# Patient Record
Sex: MALE | Race: BLACK OR AFRICAN AMERICAN
[De-identification: names, ages, dates, MRNs, and addresses within clinical notes are randomized per-mention and may not be internally consistent; named-entity substitution may affect disease eponyms.]

---

## 2017-06-15 ENCOUNTER — HOSPITAL ENCOUNTER (EMERGENCY)
Dept: HOSPITAL 7 - FB.ED | Age: 22
Discharge: HOME | End: 2017-06-15
Payer: COMMERCIAL

## 2017-06-15 VITALS — DIASTOLIC BLOOD PRESSURE: 65 MMHG | SYSTOLIC BLOOD PRESSURE: 110 MMHG

## 2017-06-15 DIAGNOSIS — F17.210: ICD-10-CM

## 2017-06-15 DIAGNOSIS — R10.84: Primary | ICD-10-CM

## 2017-06-15 PROCEDURE — 80053 COMPREHEN METABOLIC PANEL: CPT

## 2017-06-15 PROCEDURE — 82150 ASSAY OF AMYLASE: CPT

## 2017-06-15 PROCEDURE — 36415 COLL VENOUS BLD VENIPUNCTURE: CPT

## 2017-06-15 PROCEDURE — 99284 EMERGENCY DEPT VISIT MOD MDM: CPT

## 2017-06-15 PROCEDURE — 74177 CT ABD & PELVIS W/CONTRAST: CPT

## 2017-06-15 PROCEDURE — 85027 COMPLETE CBC AUTOMATED: CPT

## 2017-06-15 PROCEDURE — 96365 THER/PROPH/DIAG IV INF INIT: CPT

## 2017-06-15 PROCEDURE — 96375 TX/PRO/DX INJ NEW DRUG ADDON: CPT

## 2017-06-15 PROCEDURE — 81003 URINALYSIS AUTO W/O SCOPE: CPT

## 2017-06-15 NOTE — EDM.PDOC
ED HPI GENERAL MEDICAL PROBLEM





- General


Chief Complaint: Abdominal Pain


Stated Complaint: STOMACH PAIN, VOMITTING


Time Seen by Provider: 06/15/17 15:45


Source of Information: Reports: Patient


History Limitations: Reports: No Limitations





- History of Present Illness


INITIAL COMMENTS - FREE TEXT/NARRATIVE: 





20 yo gentleman who presents to the ER with 4 day h/o progressively worsening 

abdominal pain. Reports that symptoms started insidiously 4 days ago. Described 

pain as sharp generalized abdominal pain -- Feels pain mainly on the Left side 

of the abdomen. Reports associated N/V. Cannot seem to keep anything down. 

Denied any fever, chills, Chest pain, SOB or urinary symptoms. Presented to the 

ER on account of worsening symptoms.


Onset: Gradual


Onset Date: 06/11/17


Duration: Day(s): (started 4 days ago), Getting Worse


Location: Reports: Abdomen


Quality: Reports: Sharp, Stabbing


Severity: Severe


Worsens with: Reports: None


Associated Symptoms: Reports: Nausea/Vomiting


  ** Middle Abdomen


Pain Score (Numeric/FACES): 8





- Related Data


 Allergies











Allergy/AdvReac Type Severity Reaction Status Date / Time


 


No Known Allergies Allergy   Verified 06/15/17 15:32











Home Meds: 


 Home Meds





Ondansetron [Zofran] 4 mg PO Q6H PRN #20 tab 06/15/17 [Rx]


traMADol [Ultram] 50 mg PO Q6H PRN #20 tab 06/15/17 [Rx]











Past Medical History





- Past Health History


Medical/Surgical History: Denies Medical/Surgical History





Social & Family History





- Tobacco Use


Smoking Status *Q: Current Every Day Smoker


Years of Tobacco use: 4


Packs/Tins Daily: 0.2


Second Hand Smoke Exposure: No





- Caffeine Use


Caffeine Use: Reports: None





- Recreational Drug Use


Recreational Drug Use: No


Recreational Drug Type: Reports: Marijuana/Hashish


Recreational Drug Use Frequency: Daily





ED ROS GENERAL





- Review of Systems


Review Of Systems: ROS reveals no pertinent complaints other than HPI.





ED EXAM, GI/ABD





- Physical Exam


Exam: See Below


Exam Limited By: No Limitations


General Appearance: Alert, WD/WN, Mild Distress


Eyes: Bilateral: Normal Appearance, EOMI


Ears: Normal External Exam, Normal Canal, Hearing Grossly Normal, Normal TMs


Nose: Normal Inspection, Normal Mucosa


Throat/Mouth: Normal Inspection, Normal Lips, Normal Teeth, Normal Gums, Normal 

Oropharynx


Head: Atraumatic, Normocephalic


Neck: Normal Inspection, Supple, Non-Tender, Full Range of Motion


Respiratory/Chest: No Respiratory Distress, Lungs Clear, Normal Breath Sounds, 

No Accessory Muscle Use


Cardiovascular: Normal Peripheral Pulses, Regular Rate, Rhythm, No Edema, No 

Gallop, No JVD, No Murmur


GI/Abdominal: Normal Bowel Sounds, Soft, No Organomegaly, No Distention, 

Tenderness, Other (Vague generalized but maily upper abdominal tenderness)


Back Exam: Normal Inspection, Full Range of Motion


Extremities: Normal Inspection, Normal Range of Motion, Non-Tender, No Pedal 

Edema


Neurological: Alert, Oriented, CN II-XII Intact, Normal Cognition, Normal Gait, 

Normal Reflexes


Psychiatric: Normal Affect, Normal Mood


Skin Exam: Warm, Dry, Intact, Normal Color, No Rash


Lymphatic: No Adenopathy





Course





- Vital Signs


Last Recorded V/S: 


 Last Vital Signs











Temp  36.7 C   06/15/17 15:39


 


Pulse  57 L  06/15/17 17:16


 


Resp  16   06/15/17 17:16


 


BP  128/83   06/15/17 17:16


 


Pulse Ox  100   06/15/17 17:16














- Orders/Labs/Meds


Orders: 


 Active Orders 24 hr











 Category Date Time Status


 


 Abdomen Pelvis w Cont [CT] Stat Exams  06/15/17 15:51 Taken











Labs: 


 Laboratory Tests











  06/15/17 06/15/17 06/15/17 Range/Units





  15:55 15:55 16:30 


 


WBC  4.6    (4.5-12.0)  X10-3/uL


 


RBC  4.78    (4.30-5.75)  x10(6)uL


 


Hgb  15.3    (11.5-15.5)  g/dL


 


Hct  44.5    (30.0-51.3)  %


 


MCV  93.0    (80-96)  fL


 


MCH  32.0    (27.7-33.6)  pg


 


MCHC  34.4    (32.2-35.4)  g/dL


 


RDW  12.5    (11.5-15.5)  %


 


Plt Count  297    (125-369)  X10(3)uL


 


Sodium   141   (135-145)  mmol/L


 


Potassium   4.1   (3.5-5.3)  mmol/L


 


Chloride   105   (100-110)  mmol/L


 


Carbon Dioxide   30 H   (23-29)  mmol/L


 


BUN   12   (5-20)  mg/dL


 


Creatinine   0.9   (0.6-1.3)  mg/dL


 


Est Cr Clr Drug Dosing   135.13   mL/min


 


Estimated GFR (MDRD)   > 60   (>60)  


 


BUN/Creatinine Ratio   13.3   (9-20)  


 


Glucose   106   ()  mg/dL


 


Calcium   9.6   (8.6-10.2)  mg/dL


 


Total Bilirubin   0.8   (0.1-1.3)  mg/dL


 


AST   28 H   (5-27)  IU/L


 


ALT   21   (14-26)  IU/L


 


Alkaline Phosphatase   77   ()  IU/L


 


Total Protein   7.9   (6.0-8.0)  g/dL


 


Albumin   4.5   (3.5-5.2)  g/dL


 


Globulin   3.4   g/dL


 


Albumin/Globulin Ratio   1.3   


 


Amylase   78   ()  U/L


 


Urine Color    Yellow  (YELLOW)  


 


Urine Appearance    Clear  (CLEAR)  


 


Urine pH    8.0 H  (5.0-6.5)  


 


Ur Specific Gravity    1.015  (1.010-1.025)  


 


Urine Protein    Negative  (NEGATIVE)  mg/dL


 


Urine Glucose (UA)    Normal  (NEGATIVE)  mg/dL


 


Urine Ketones    Negative  (NEGATIVE)  mg/dL


 


Urine Occult Blood    Negative  (NEGATIVE)  


 


Urine Nitrite    Negative  (NEGATIVE)  


 


Urine Bilirubin    Negative  (NEGATIVE)  


 


Urine Urobilinogen    Normal  (NEGATIVE)  mg/dL


 


Ur Leukocyte Esterase    Negative  (NEGATIVE)  











Meds: 


Medications














Discontinued Medications














Generic Name Dose Route Start Last Admin





  Trade Name Freq  PRN Reason Stop Dose Admin


 


Sodium Chloride  1,000 mls @ 1,000 mls/hr  06/15/17 15:52  06/15/17 16:06





  Normal Saline  IV  06/15/17 16:51  1,000 mls/hr





  .BOLUS ONE   Administration


 


Iopamidol  75 ml  06/15/17 16:47  06/15/17 17:12





  Isovue-370 (76%)  IV  06/15/17 16:48  75 ml





  ONETIME ONE   Administration


 


Morphine Sulfate  2 mg  06/15/17 15:53  06/15/17 16:05





  Morphine  IVPUSH  06/15/17 15:54  2 mg





  ONETIME ONE   Administration


 


Ondansetron HCl  4 mg  06/15/17 15:53  06/15/17 16:06





  Zofran  IVPUSH  06/15/17 15:54  4 mg





  ONETIME ONE   Administration














Departure





- Departure


Time of Disposition: 18:25


Disposition: Home, Self-Care 01


Clinical Impression: 


Abdominal pain


Qualifiers:


 Abdominal location: generalized Qualified Code(s): R10.84 - Generalized 

abdominal pain








- Discharge Information


Prescriptions: 


Ondansetron [Zofran] 4 mg PO Q6H PRN #20 tab


 PRN Reason: nausea, vomiting


traMADol [Ultram] 50 mg PO Q6H PRN #20 tab


 PRN Reason: Pain


Instructions:  Abdominal Pain, Adult, Easy-to-Read


Referrals: 


PCP,None [Primary Care Provider] - 


Forms:  ED Department Discharge


Additional Instructions: 


Follow with PCP


Tramadol as needed for pain


Return if symptoms worsen


Call your Physician or Return to Emergency Department if:





   * Your condition worsens in any way.


   * You develop fever greater than 100.4.


   * You have vomitting that does not stop with medications.


   * You have pain that is not controlled with medications.





- Problem List Review


Problem List Initiated/Reviewed/Updated: Yes





- My Orders


Last 24 Hours: 


My Active Orders





06/15/17 15:51


Abdomen Pelvis w Cont [CT] Stat 














- Assessment/Plan


Last 24 Hours: 


My Active Orders





06/15/17 15:51


Abdomen Pelvis w Cont [CT] Stat

## 2017-06-24 ENCOUNTER — HOSPITAL ENCOUNTER (EMERGENCY)
Dept: HOSPITAL 7 - FB.ED | Age: 22
Discharge: HOME | End: 2017-06-24
Payer: COMMERCIAL

## 2017-06-24 VITALS — DIASTOLIC BLOOD PRESSURE: 82 MMHG | SYSTOLIC BLOOD PRESSURE: 112 MMHG

## 2017-06-24 DIAGNOSIS — F17.210: ICD-10-CM

## 2017-06-24 DIAGNOSIS — R10.9: Primary | ICD-10-CM

## 2017-06-24 DIAGNOSIS — G89.29: ICD-10-CM

## 2017-06-24 PROCEDURE — 80053 COMPREHEN METABOLIC PANEL: CPT

## 2017-06-24 PROCEDURE — 81001 URINALYSIS AUTO W/SCOPE: CPT

## 2017-06-24 PROCEDURE — 99284 EMERGENCY DEPT VISIT MOD MDM: CPT

## 2017-06-24 PROCEDURE — 36415 COLL VENOUS BLD VENIPUNCTURE: CPT

## 2017-06-24 PROCEDURE — 85025 COMPLETE CBC W/AUTO DIFF WBC: CPT

## 2017-06-24 PROCEDURE — 74177 CT ABD & PELVIS W/CONTRAST: CPT

## 2017-06-24 NOTE — EDM.PDOC
ED HPI GENERAL MEDICAL PROBLEM





- General


Chief Complaint: Abdominal Pain


Stated Complaint: STOMACH PAIN


Time Seen by Provider: 06/24/17 10:00


Source of Information: Reports: Patient


History Limitations: Reports: No Limitations





- History of Present Illness


Onset: Today


Onset Date: 04/24/17


Duration: Chronic, Waxing/Waning


Location: Reports: Abdomen


Quality: Reports: Ache, Dull, Same as Previous Episode


Severity: Moderate


Improves with: Reports: None


Worsens with: Reports: None


Context: Reports: Other (Chronic in nature.)


Associated Symptoms: Reports: No Other Symptoms (Ch)


Treatments PTA: Reports: Other (see below) (Tramadol)


  ** Abdominal


Pain Score (Numeric/FACES): 8





- Related Data


 Allergies











Allergy/AdvReac Type Severity Reaction Status Date / Time


 


No Known Allergies Allergy   Verified 06/24/17 09:49











Home Meds: 


 Home Meds





Ondansetron [Zofran] 4 mg PO Q6H PRN #20 tab 06/15/17 [Rx]


traMADol [Ultram] 50 mg PO Q6H PRN #20 tab 06/15/17 [Rx]











Past Medical History





- Past Health History


Medical/Surgical History: Denies Medical/Surgical History





Social & Family History





- Tobacco Use


Smoking Status *Q: Current Every Day Smoker


Years of Tobacco use: 4


Packs/Tins Daily: 1


Second Hand Smoke Exposure: No





- Caffeine Use


Caffeine Use: Reports: Energy Drinks, Soda





- Recreational Drug Use


Recreational Drug Use: No


Recreational Drug Type: Reports: Marijuana/Hashish


Recreational Drug Use Frequency: Daily





ED ROS GENERAL





- Review of Systems


Review Of Systems: See Below


Constitutional: Reports: No Symptoms


HEENT: Reports: No Symptoms


Respiratory: Reports: No Symptoms


Cardiovascular: Reports: No Symptoms


Endocrine: Reports: No Symptoms


GI/Abdominal: Reports: Abdominal Pain


: Reports: No Symptoms


Musculoskeletal: Reports: No Symptoms


Skin: Reports: No Symptoms


Neurological: Reports: No Symptoms


Psychiatric: Reports: No Symptoms


Hematologic/Lymphatic: Reports: No Symptoms


Immunologic: Reports: No Symptoms





ED EXAM, GI/ABD





- Physical Exam


Exam: See Below


Exam Limited By: No Limitations


General Appearance: Alert, WD/WN, No Apparent Distress


Eyes: Bilateral: Normal Appearance, EOMI


Ears: Normal External Exam, Normal Canal, Hearing Grossly Normal, Normal TMs


Nose: Normal Inspection, Normal Mucosa, No Blood


Throat/Mouth: Normal Inspection, Normal Lips, Normal Teeth, Normal Gums, Normal 

Oropharynx, Normal Voice, No Airway Compromise


Head: Atraumatic, Normocephalic


Neck: Normal Inspection, Supple, Non-Tender, Full Range of Motion


Respiratory/Chest: No Respiratory Distress, Lungs Clear, Normal Breath Sounds, 

No Accessory Muscle Use, Chest Non-Tender


Cardiovascular: Normal Peripheral Pulses


GI/Abdominal: Normal Bowel Sounds, Soft, No Organomegaly, No Distention, No 

Abnormal Bruit, No Mass, Tenderness (minimally in left upper quadrant area.)


 (Male) Exam: No Hernia, Normal Inspection, Normal Prostate, Circumcised


Back Exam: Normal Inspection, Full Range of Motion, NT


Extremities: Normal Inspection, Normal Range of Motion, Non-Tender, Normal 

Capillary Refill, No Pedal Edema


Neurological: Alert, Oriented, CN II-XII Intact, Normal Cognition, Normal Gait, 

Normal Reflexes, No Motor/Sensory Deficits


Psychiatric: Normal Affect, Normal Mood


Skin Exam: Warm, Dry, Intact, Normal Color, No Rash





Course





- Vital Signs


Text/Narrative:: 





Uneventful ED course. His pain came down from an 8/10 level to a 2/10 level and 

after the normal labs and a CT of the abdomen that shows constipation he wanted 

to go home. He will follow up in [place] with a General Internist or Hematologist 

since he thinks he may have Sickle Cell Trait that could be causing some 

abdominal discomfort.


Last Recorded V/S: 


 Last Vital Signs











Temp  36.8 C   06/24/17 12:18


 


Pulse  59 L  06/24/17 12:18


 


Resp  16   06/24/17 12:18


 


BP  112/82   06/24/17 12:18


 


Pulse Ox  100   06/24/17 12:18














- Orders/Labs/Meds


Orders: 


 Active Orders 24 hr











 Category Date Time Status


 


 Abdomen Pelvis w Cont [CT] Stat Exams  06/24/17 10:16 Taken


 


 Sodium Chloride 0.9% [Saline Flush] Med  06/24/17 10:21 Active





 10 ml FLUSH ASDIRECTED PRN   


 


 Saline Lock Insert [OM.PC] Routine Oth  06/24/17 10:21 Ordered








 Medication Orders





Sodium Chloride (Saline Flush)  10 ml FLUSH ASDIRECTED PRN


   PRN Reason: Keep Vein Open


   Last Admin: 06/24/17 10:55  Dose: 10 ml








Labs: 


 Laboratory Tests











  06/24/17 06/24/17 06/24/17 Range/Units





  10:35 10:35 11:00 


 


WBC  5.6    (4.5-12.0)  X10-3/uL


 


RBC  4.61    (4.30-5.75)  x10(6)uL


 


Hgb  14.9    (11.5-15.5)  g/dL


 


Hct  43.3    (30.0-51.3)  %


 


MCV  94.0    (80-96)  fL


 


MCH  32.4    (27.7-33.6)  pg


 


MCHC  34.5    (32.2-35.4)  g/dL


 


RDW  12.5    (11.5-15.5)  %


 


Plt Count  310    (125-369)  X10(3)uL


 


MPV  7.4    (7.4-10.4)  fL


 


Neut % (Auto)  46.0    (46-82)  %


 


Lymph % (Auto)  38.1 H    (13-37)  %


 


Mono % (Auto)  12.6 H    (4-12)  %


 


Eos % (Auto)  2    (1.0-5.0)  %


 


Baso % (Auto)  1    (0-2)  %


 


Neut # (Auto)  2.6    (1.6-8.3)  #


 


Lymph # (Auto)  2.1    (0.6-5.0)  #


 


Mono # (Auto)  0.7    (0.0-1.3)  #


 


Eos # (Auto)  0.1    (0.0-0.8)  #


 


Baso # (Auto)  0.1    (0.0-0.2)  #


 


Sodium   139   (135-145)  mmol/L


 


Potassium   3.7   (3.5-5.3)  mmol/L


 


Chloride   104   (100-110)  mmol/L


 


Carbon Dioxide   29   (23-29)  mmol/L


 


BUN   13   (5-20)  mg/dL


 


Creatinine   0.8   (0.6-1.3)  mg/dL


 


Est Cr Clr Drug Dosing   145.57   mL/min


 


Estimated GFR (MDRD)   > 60   (>60)  


 


BUN/Creatinine Ratio   16.3   (9-20)  


 


Glucose   96   ()  mg/dL


 


Calcium   9.3   (8.6-10.2)  mg/dL


 


Total Bilirubin   0.5   (0.1-1.3)  mg/dL


 


AST   22  D   (5-27)  IU/L


 


ALT   16  D   (14-26)  IU/L


 


Alkaline Phosphatase   70   ()  IU/L


 


Total Protein   7.4   (6.0-8.0)  g/dL


 


Albumin   4.2   (3.5-5.2)  g/dL


 


Globulin   3.2   g/dL


 


Albumin/Globulin Ratio   1.3   


 


Urine Color    Yellow  (YELLOW)  


 


Urine Appearance    Turbid  (CLEAR)  


 


Urine pH    6.0  (5.0-6.5)  


 


Ur Specific Gravity    1.020  (1.010-1.025)  


 


Urine Protein    Negative  (NEGATIVE)  mg/dL


 


Urine Glucose (UA)    Normal  (NEGATIVE)  mg/dL


 


Urine Ketones    Negative  (NEGATIVE)  mg/dL


 


Urine Occult Blood    Negative  (NEGATIVE)  


 


Urine Nitrite    Negative  (NEGATIVE)  


 


Urine Bilirubin    Small H  (NEGATIVE)  


 


Urine Urobilinogen    4 H  (NEGATIVE)  mg/dL


 


Ur Leukocyte Esterase    Negative  (NEGATIVE)  


 


Urine RBC    5-10  (0)  


 


Urine WBC    0-5  (0)  


 


Ur Squamous Epith Cells    Rare  (NS,R,O)  


 


Urine Bacteria    Few H  (NS)  


 


Urine Mucus    Moderate H  (NS)  











Meds: 


Medications











Generic Name Dose Route Start Last Admin





  Trade Name Ramon  PRN Reason Stop Dose Admin


 


Sodium Chloride  10 ml  06/24/17 10:21  06/24/17 10:55





  Saline Flush  FLUSH   10 ml





  ASDIRECTED PRN   Administration





  Keep Vein Open   














Discontinued Medications














Generic Name Dose Route Start Last Admin





  Trade Name Ramon  PRN Reason Stop Dose Admin


 


Iopamidol  75 ml  06/24/17 10:43  06/24/17 11:00





  Isovue-370 (76%)  IV  06/24/17 10:44  75 ml





  ONETIME ONE   Administration














Departure





- Departure


Time of Disposition: 12:18


Disposition: DC/Tfer to Hospice - Home 50


Condition: Good


Clinical Impression: 


 Chronic abdominal pain








- Discharge Information


Instructions:  Abdominal Pain, Adult, Easy-to-Read


Referrals: 


PCP,None [Primary Care Provider] - 


Forms:  ED Department Discharge





- My Orders


Last 24 Hours: 


My Active Orders





06/24/17 10:16


Abdomen Pelvis w Cont [CT] Stat 





06/24/17 10:21


Sodium Chloride 0.9% [Saline Flush]   10 ml FLUSH ASDIRECTED PRN 


Saline Lock Insert [OM.PC] Routine 














- Assessment/Plan


Last 24 Hours: 


My Active Orders





06/24/17 10:16


Abdomen Pelvis w Cont [CT] Stat 





06/24/17 10:21


Sodium Chloride 0.9% [Saline Flush]   10 ml FLUSH ASDIRECTED PRN 


Saline Lock Insert [OM.PC] Routine

## 2019-11-07 ENCOUNTER — HOSPITAL ENCOUNTER (EMERGENCY)
Dept: HOSPITAL 7 - FB.ED | Age: 24
Discharge: HOME | End: 2019-11-07
Payer: MEDICAID

## 2019-11-07 VITALS — DIASTOLIC BLOOD PRESSURE: 66 MMHG | HEART RATE: 72 BPM | SYSTOLIC BLOOD PRESSURE: 123 MMHG
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
## 2019-11-07 DIAGNOSIS — S63.501A: Primary | ICD-10-CM

## 2019-11-07 PROCEDURE — 99283 EMERGENCY DEPT VISIT LOW MDM: CPT

## 2019-11-07 PROCEDURE — 73110 X-RAY EXAM OF WRIST: CPT

## 2019-11-07 PROCEDURE — 73130 X-RAY EXAM OF HAND: CPT

## 2019-11-07 NOTE — EDM.PDOC
ED HPI GENERAL MEDICAL PROBLEM





- General


Stated Complaint: R HAND INJURY


Time Seen by Provider: 11/07/19 08:15


Source of Information: Reports: Patient


History Limitations: Reports: No Limitations





- History of Present Illness


INITIAL COMMENTS - FREE TEXT/NARRATIVE: 





Patient prsented to the ED because of rt wrist and rt hand pain. He apparently 

was playing basketball yesterday lost his balance,fell and landed on his RUE.


  ** right hand


Pain Score (Numeric/FACES): 5





- Related Data


 Allergies











Allergy/AdvReac Type Severity Reaction Status Date / Time


 


No Known Allergies Allergy   Verified 11/07/19 08:41











Home Meds: 


 Home Meds





Ibuprofen [Motrin] 800 mg PO Q8H PRN #30 tablet 11/07/19 [Rx]











Past Medical History





- Past Health History


Medical/Surgical History: Denies Medical/Surgical History





Social & Family History





- Caffeine Use


Caffeine Use: Reports: Energy Drinks, Soda





Review of Systems





- Review of Systems


Review Of Systems: See Below


Constitutional: Reports: No Symptoms


Eyes: Reports: No Symptoms


Ears: Reports: No Symptoms


Nose: Reports: No Symptoms


Mouth/Throat: Reports: No Symptoms


Respiratory: Reports: No Symptoms


Cardiovascular: Reports: No Symptoms


GI/Abdominal: Reports: No Symptoms


Genitourinary: Reports: No Symptoms


Musculoskeletal: Reports: Other (tenderness on the rthand/wrist)


Skin: Reports: No Symptoms


Neurological: Reports: No Symptoms





ED EXAM, GENERAL





- Physical Exam


Exam: See Below


Exam Limited By: No Limitations


General Appearance: Alert, WD/WN, No Apparent Distress


Eye Exam: Bilateral Eye: PERRL


Ears: Normal External Exam


Nose: Normal Inspection, Normal Mucosa, No Blood


Throat/Mouth: Normal Inspection, Normal Lips, Normal Teeth


Head: Atraumatic, Normocephalic


Neck: Normal Inspection, Supple, Non-Tender, Full Range of Motion


Respiratory/Chest: No Respiratory Distress, Lungs Clear, Normal Breath Sounds, 

No Accessory Muscle Use


Cardiovascular: Normal Peripheral Pulses, Regular Rate, Rhythm, No Edema, No 

Gallop, No JVD, No Rub, JVD


Back Exam: Normal Inspection, Full Range of Motion


Extremities: Other (swelling and tenderness R hand/wrist)





Course





- Vital Signs


Text/Narrative:: 





xray RT wirst


Rt hand


ibuprofen 800 mg po x1


tylenol 1000 mg po x1


Last Recorded V/S: 


 Last Vital Signs











Temp  36.5 C   11/07/19 08:10


 


Pulse  72   11/07/19 08:10


 


Resp  22 H  11/07/19 08:10


 


BP  123/66   11/07/19 08:10


 


Pulse Ox  99   11/07/19 08:10














- Orders/Labs/Meds


Orders: 


 Active Orders 24 hr











 Category Date Time Status


 


 Hand Comp Min 3V Rt [CR] Stat Exams  11/07/19 08:27 Taken


 


 Wrist Comp Min 3V Rt [CR] Stat Exams  11/07/19 08:27 Taken











Meds: 


Medications














Discontinued Medications














Generic Name Dose Route Start Last Admin





  Trade Name Ramon  PRN Reason Stop Dose Admin


 


Acetaminophen  1,000 mg  11/07/19 08:28  11/07/19 08:43





  Tylenol Extra Strength  PO  11/07/19 08:29  1,000 mg





  ONETIME ONE   Administration





     





     





     





     


 


Ibuprofen  800 mg  11/07/19 08:28  11/07/19 08:43





  Motrin  PO  11/07/19 08:29  800 mg





  ONETIME ONE   Administration





     





     





     





     














Departure





- Departure


Time of Disposition: 08:55


Disposition: Home, Self-Care 01


Condition: Good


Clinical Impression: 


 Sprain of wrist, right








- Discharge Information


Prescriptions: 


Ibuprofen [Motrin] 800 mg PO Q8H PRN #30 tablet


 PRN Reason: Pain


Instructions:  Wrist Sprain, Adult


Forms:  ED Department Discharge


Additional Instructions: 


please read discharge instructions on wrist and hand sprain


apply ice


elevate


take ibuprofen 800 mg with tylenol 1000 mg every 8 hours as needed for pain


we will call you if there is any change on the xray reading





- My Orders


Last 24 Hours: 


My Active Orders





11/07/19 08:27


Hand Comp Min 3V Rt [CR] Stat 


Wrist Comp Min 3V Rt [CR] Stat 














- Assessment/Plan


Last 24 Hours: 


My Active Orders





11/07/19 08:27


Hand Comp Min 3V Rt [CR] Stat 


Wrist Comp Min 3V Rt [CR] Stat

## 2019-11-08 NOTE — CR
INDICATION:  Fall playing football.  Right hand and wrist pain. 



RIGHT WRIST:  Three views of the right wrist were obtained, 11/07/19 - no 
comparisons. 



A definite acute fracture or dislocation was not identified.  



There is very slight volar fat pad deviation at the wrist, suggesting a minimal 
wrist joint effusion - correlate clinically.



No other bone or joint abnormality was suggested.  



IMPRESSION: 

1.  Question minimal wrist joint effusion.

2.  No acute fracture or dislocation identified. 



If symptoms persist - if occult fracture site is suspected clinically, re-
examination in 10-14 days may be helpful.

MTDD

## 2020-05-24 ENCOUNTER — HOSPITAL ENCOUNTER (EMERGENCY)
Dept: HOSPITAL 7 - FB.ED | Age: 25
LOS: 1 days | Discharge: HOME | End: 2020-05-25
Payer: MEDICAID

## 2020-05-24 VITALS — DIASTOLIC BLOOD PRESSURE: 75 MMHG | SYSTOLIC BLOOD PRESSURE: 130 MMHG | HEART RATE: 64 BPM

## 2020-05-24 DIAGNOSIS — F17.210: ICD-10-CM

## 2020-05-24 DIAGNOSIS — J02.9: Primary | ICD-10-CM

## 2020-05-24 PROCEDURE — 99283 EMERGENCY DEPT VISIT LOW MDM: CPT

## 2020-05-24 PROCEDURE — 87880 STREP A ASSAY W/OPTIC: CPT

## 2020-05-25 NOTE — EDM.PDOC
ED HPI GENERAL MEDICAL PROBLEM





- General


Chief Complaint: ENT Problem


Stated Complaint: sore throat


Time Seen by Provider: 05/25/20 00:15


Source of Information: Reports: Patient


History Limitations: Reports: No Limitations





- History of Present Illness


INITIAL COMMENTS - FREE TEXT/NARRATIVE: 


Sore throat x 3 days,no fever or cough. Nothing makes it better.


  ** Throat


Pain Score (Numeric/FACES): 9





- Related Data


 Allergies











Allergy/AdvReac Type Severity Reaction Status Date / Time


 


No Known Allergies Allergy   Verified 11/07/19 08:41











Home Meds: 


 Home Meds





Ibuprofen [Motrin] 800 mg PO Q8H PRN #30 tablet 11/07/19 [Rx]











Past Medical History





- Past Health History


Medical/Surgical History: Denies Medical/Surgical History





Social & Family History





- Tobacco Use


Smoking Status *Q: Current Every Day Smoker


Years of Tobacco use: 7


Packs/Tins Daily: 1





- Caffeine Use


Caffeine Use: Reports: Energy Drinks, Soda





ED ROS ENT





- Review of Systems


Review Of Systems: Comprehensive ROS is negative, except as noted in HPI.





ED EXAM, ENT





- Physical Exam


Exam: See Below


Exam Limited By: No Limitations


General Appearance: Alert


Nose: Normal Inspection


Mouth/Throat: Tonsillar Erythema, Tonsillar Exudates


Head: Atraumatic


Neck: Normal Inspection, Lymphadenopathy (L)





Course





- Vital Signs


Last Recorded V/S: 


 Last Vital Signs











Temp  98.8 F   05/24/20 23:53


 


Pulse  64   05/24/20 23:53


 


Resp  18   05/24/20 23:53


 


BP  130/75   05/24/20 23:53


 


Pulse Ox  100   05/24/20 23:53














Departure





- Departure


Time of Disposition: 19:09


Disposition: Home, Self-Care 01


Condition: Good


Clinical Impression: 


 Sore throat








- Discharge Information


Instructions:  Strep Throat, Easy-to-Read, Azithromycin tablets


Referrals: 


PCP,None [Primary Care Provider] - 


Forms:  ED Department Discharge


Additional Instructions: 


Take Azithromycin as directed until course is complete. 


Follow up with primary care physician if symptoms do not improve after course 

of antibiotics. 





Sepsis Event Note





- Evaluation


Sepsis Screening Result: No Definite Risk





- Focused Exam


Date Exam was Performed: 05/25/20


Time Exam was Performed: 19:09





- Problem List & Annotations


(1) Sore throat


SNOMED Code(s): 009388370


   Code(s): J02.9 - ACUTE PHARYNGITIS, UNSPECIFIED   Status: Acute   





- Problem List Review


Problem List Initiated/Reviewed/Updated: Yes





- Assessment/Plan


Plan: 


Zpak

## 2020-06-19 ENCOUNTER — HOSPITAL ENCOUNTER (EMERGENCY)
Dept: HOSPITAL 7 - FB.ED | Age: 25
Discharge: HOME | End: 2020-06-19
Payer: MEDICAID

## 2020-06-19 VITALS — HEART RATE: 70 BPM | DIASTOLIC BLOOD PRESSURE: 80 MMHG | SYSTOLIC BLOOD PRESSURE: 121 MMHG

## 2020-06-19 DIAGNOSIS — S91.331A: Primary | ICD-10-CM
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
## 2020-06-19 NOTE — EDM.PDOC
ED HPI GENERAL MEDICAL PROBLEM





- General


Chief Complaint: Skin Complaint


Stated Complaint: STEPPED ON NAIL


Time Seen by Provider: 06/19/20 22:20





- History of Present Illness


INITIAL COMMENTS - FREE TEXT/NARRATIVE: 





Jimmy comes into [facility] ED with a small puncture wound to the R foot from a nail 

while barbara today. He told his supervisor, but did not bring any WC documents 

with him. He is unaware of vax status, and will need updating. 





- Related Data


                                    Allergies











Allergy/AdvReac Type Severity Reaction Status Date / Time


 


No Known Allergies Allergy   Verified 11/07/19 08:41











Home Meds: 


                                    Home Meds





Ibuprofen [Motrin] 800 mg PO Q8H PRN #30 tablet 11/07/19 [Rx]











Past Medical History





- Past Health History


Medical/Surgical History: Denies Medical/Surgical History





Social & Family History





- Caffeine Use


Caffeine Use: Reports: Energy Drinks, Soda





ED ROS GENERAL





- Review of Systems


Review Of Systems: See Below





ED EXAM, SKIN/RASH


Exam: See Below


Exam Limited By: No Limitations


General Appearance: Alert, WD/WN, No Apparent Distress


Head: Normocephalic


Neck: Normal Inspection


Respiratory/Chest: Lungs Clear


Cardiovascular: Regular Rate, Rhythm


Back Exam: Normal Inspection


Extremities: Normal Range of Motion, Non-Tender, No Pedal Edema, Normal 

Capillary Refill, Other (small puncture site of plantar R foot near 1st MT pad)


Neurological: Alert, Oriented, CN II-XII Intact, Normal Cognition, Normal Gait, 

No Motor/Sensory Deficits


Psychiatric: Normal Affect, Normal Mood


Skin: Warm, Dry, Normal Color, No Rash, Wound/Incision (small superficial 

puncture site near 1st MT pad, no erythema, looks clean)





Course





- Vital Signs


Text/Narrative:: 





Minor puncture wound to R foot. I administered an Adacel vax booster. 





- Orders/Labs/Meds


Orders: 





                               Active Orders 24 hr











 Category Date Time Status


 


 Vaccines to be Administered [RC] PER UNIT ROUTINE Care  06/19/20 22:20 Ordered











Meds: 





Medications














Discontinued Medications














Generic Name Dose Route Start Last Admin





  Trade Name Freq  PRN Reason Stop Dose Admin


 


Diphtheria/Tetanus/Acell Pertussis  0.5 ml  06/19/20 22:20 





  Adacel  IM  06/19/20 22:21 





  .ONCE ONE  














Departure





- Departure


Time of Disposition: 22:40


Disposition: Home, Self-Care 01


Condition: Good


Clinical Impression: 


Puncture wound of right foot


Qualifiers:


 Encounter type: initial encounter Qualified Code(s): S91.331A - Puncture wound 

without foreign body, right foot, initial encounter








- Discharge Information


*PRESCRIPTION DRUG MONITORING PROGRAM REVIEWED*: Not Applicable


*COPY OF PRESCRIPTION DRUG MONITORING REPORT IN PATIENT ALEJANDRO: Not Applicable


Referrals: 


Nando Perrin MD [Primary Care Provider] - 


Forms:  ED Department Discharge





- Problem List & Annotations


(1) Puncture wound of right foot


SNOMED Code(s): 97612751393597250


   Code(s): S91.331A - PUNCTURE WOUND WITHOUT FOREIGN BODY, RIGHT FOOT, INIT 

ENCNTR   Status: Acute   Current Visit: Yes   Annotation/Comment:: Routine wound

care and wear bandaid this weekend.    


Qualifiers: 


   Encounter type: initial encounter   Qualified Code(s): S91.331A - Puncture 

wound without foreign body, right foot, initial encounter   





- Problem List Review


Problem List Initiated/Reviewed/Updated: Yes





- My Orders


Last 24 Hours: 





My Active Orders





06/19/20 22:20


Vaccines to be Administered [RC] PER UNIT ROUTINE 














- Assessment/Plan


Last 24 Hours: 





My Active Orders





06/19/20 22:20


Vaccines to be Administered [RC] PER UNIT ROUTINE 











Plan: 





Follow up with PCP if needed.

## 2020-08-01 ENCOUNTER — HOSPITAL ENCOUNTER (EMERGENCY)
Dept: HOSPITAL 7 - FB.ED | Age: 25
LOS: 1 days | Discharge: HOME | End: 2020-08-02
Payer: MEDICAID

## 2020-08-01 VITALS — HEART RATE: 68 BPM

## 2020-08-01 DIAGNOSIS — Z20.828: ICD-10-CM

## 2020-08-01 DIAGNOSIS — K52.9: Primary | ICD-10-CM

## 2020-08-01 PROCEDURE — 85025 COMPLETE CBC W/AUTO DIFF WBC: CPT

## 2020-08-01 PROCEDURE — 96361 HYDRATE IV INFUSION ADD-ON: CPT

## 2020-08-01 PROCEDURE — 87635 SARS-COV-2 COVID-19 AMP PRB: CPT

## 2020-08-01 PROCEDURE — 83690 ASSAY OF LIPASE: CPT

## 2020-08-01 PROCEDURE — 87880 STREP A ASSAY W/OPTIC: CPT

## 2020-08-01 PROCEDURE — 86140 C-REACTIVE PROTEIN: CPT

## 2020-08-01 PROCEDURE — U0002 COVID-19 LAB TEST NON-CDC: HCPCS

## 2020-08-01 PROCEDURE — 36415 COLL VENOUS BLD VENIPUNCTURE: CPT

## 2020-08-01 PROCEDURE — 80307 DRUG TEST PRSMV CHEM ANLYZR: CPT

## 2020-08-01 PROCEDURE — 80053 COMPREHEN METABOLIC PANEL: CPT

## 2020-08-01 PROCEDURE — 99284 EMERGENCY DEPT VISIT MOD MDM: CPT

## 2020-08-01 PROCEDURE — 96374 THER/PROPH/DIAG INJ IV PUSH: CPT

## 2020-08-01 PROCEDURE — 87081 CULTURE SCREEN ONLY: CPT

## 2020-08-01 PROCEDURE — 71046 X-RAY EXAM CHEST 2 VIEWS: CPT

## 2020-08-01 PROCEDURE — 96375 TX/PRO/DX INJ NEW DRUG ADDON: CPT
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
## 2020-08-02 VITALS — DIASTOLIC BLOOD PRESSURE: 72 MMHG | SYSTOLIC BLOOD PRESSURE: 123 MMHG

## 2020-08-03 NOTE — CR
INDICATION:  Wheeze, shortness of breath. 



CHEST, TWO VIEWS:  Two PA views and a lateral view of the chest were obtained 
08/01/20 - no comparison.  



A very minimal scoliosis of the thoracic spine is noted with the heart normal in
size and shape.  The aorta is minimally tortuous.  



A definite active infiltrate or effusion was not identified. 



Increased density over the upper lung fields is felt to be on the basis of 
overlying soft tissue-musculature. 



IMPRESSION:   No acute process. 

MTDD

## 2021-01-18 ENCOUNTER — HOSPITAL ENCOUNTER (EMERGENCY)
Dept: HOSPITAL 7 - FB.ED | Age: 26
Discharge: HOME | End: 2021-01-18
Payer: MEDICAID

## 2021-01-18 VITALS — HEART RATE: 65 BPM | SYSTOLIC BLOOD PRESSURE: 145 MMHG | DIASTOLIC BLOOD PRESSURE: 89 MMHG

## 2021-01-18 DIAGNOSIS — R11.2: Primary | ICD-10-CM

## 2021-01-18 DIAGNOSIS — R19.7: ICD-10-CM

## 2021-01-18 DIAGNOSIS — R53.1: ICD-10-CM

## 2021-01-18 DIAGNOSIS — R05: ICD-10-CM

## 2021-01-18 RX ADMIN — KETOROLAC TROMETHAMINE ONE: 30 INJECTION, SOLUTION INTRAMUSCULAR at 20:19

## 2021-01-18 RX ADMIN — ONDANSETRON ONE: 2 INJECTION, SOLUTION INTRAMUSCULAR; INTRAVENOUS at 20:19

## 2021-01-18 NOTE — EDM.PDOC
ED HPI GENERAL MEDICAL PROBLEM





- General


Stated Complaint: covid symptoms


Time Seen by Provider: 01/18/21 14:50


Source of Information: Reports: Patient


History Limitations: Reports: No Limitations





- History of Present Illness


INITIAL COMMENTS - FREE TEXT/NARRATIVE: 





c/o weak and diarrhea





pt worked at [employer] x 1y, usually wears a mask, has had a slight cough x 2w 

productive of minimal fluid, has rhinorrhea





lives alone





tried to work today and field weak





had V x 1 yesterday, V x 4 today





had loose BM x 2 today





smokes 2 ppd, no THC,





no f/c/d, not exposed to anyone who is sick





ate noodles for bfast, then had emesis





no pain except abd soreness with V, not now





- Related Data


                                    Allergies











Allergy/AdvReac Type Severity Reaction Status Date / Time


 


No Known Allergies Allergy   Verified 08/02/20 07:34











Home Meds: 


                                    Home Meds





Ondansetron [Ondansetron ODT] 4 mg PO Q6H PRN #6 tab.rapdis 01/18/21 [Rx]











Past Medical History





- Past Health History


Medical/Surgical History: Denies Medical/Surgical History





Social & Family History





- Caffeine Use


Caffeine Use: Reports: Energy Drinks, Soda





ED ROS GENERAL





- Review of Systems


Review Of Systems: See Below


Constitutional: Reports: No Symptoms


HEENT: Reports: No Symptoms


Respiratory: Reports: Shortness of Breath, Cough


Cardiovascular: Reports: No Symptoms


Endocrine: Reports: No Symptoms


GI/Abdominal: Reports: Abdominal Pain, Diarrhea


: Reports: No Symptoms


Musculoskeletal: Reports: No Symptoms


Skin: Reports: No Symptoms


Neurological: Reports: No Symptoms


Psychiatric: Reports: No Symptoms


Hematologic/Lymphatic: Reports: No Symptoms


Immunologic: Reports: No Symptoms





ED EXAM, GI/ABD





- Physical Exam


Exam: See Below


Exam Limited By: No Limitations


General Appearance: Alert, WD/WN, No Apparent Distress, Other (muscular, 

pleasant, cooperative, NAD)


Nose: Normal Inspection


Throat/Mouth: Normal Inspection, Normal Voice, No Airway Compromise


Head: Atraumatic


Neck: Normal Inspection, Supple, Non-Tender, Full Range of Motion.  No: 

Lymphadenopathy (L)


Respiratory/Chest: No Respiratory Distress, Lungs Clear, Normal Breath Sounds, 

No Accessory Muscle Use, Chest Non-Tender


Cardiovascular: Regular Rate, Rhythm, No Edema, No Murmur


GI/Abdominal Exam: Normal Bowel Sounds, Soft, Non-Tender, No Distention, No Mass


Back Exam: Normal Inspection


Extremities: Normal Inspection, Normal Range of Motion, Non-Tender, No Pedal 

Edema


Neurological: Alert, Oriented, CN II-XII Intact, Normal Cognition, Normal Gait, 

No Motor/Sensory Deficits


Psychiatric: Normal Affect, Normal Mood


Skin Exam: Warm, Dry, Intact, Normal Color, No Rash


Lymphatic: No Adenopathy





Course





- Vital Signs


Last Recorded V/S: 


                                Last Vital Signs











Temp  36.7 C   01/18/21 13:38


 


Pulse  89   01/18/21 13:38


 


Resp  17   01/18/21 13:38


 


BP  162/71 H  01/18/21 13:38


 


Pulse Ox  98   01/18/21 13:38














- Orders/Labs/Meds


Orders: 


                               Active Orders 24 hr











 Category Date Time Status


 


 C-REACTIVE PROTEIN [CHEM] Stat Lab  01/18/21 15:21 Ordered


 


 CORONAVIRUS COVID-19 MATT [MOLEC] Stat Lab  01/18/21 15:21 Ordered


 


 LIPASE [CHEM] Stat Lab  01/18/21 15:24 Ordered


 


 Sodium Chloride 0.9% [Normal Saline] 1,000 ml Med  01/18/21 15:21 Ordered





 IV .BOLUS   








                                Medication Orders





Sodium Chloride (Normal Saline)  1,000 mls @ 999 mls/hr IV .BOLUS ONE


   Stop: 01/18/21 16:21








Labs: 


                                Laboratory Tests











  01/18/21 01/18/21 Range/Units





  15:32 15:32 


 


WBC  5.9   (3.2-10.1)  x10-3/uL


 


RBC  4.81   (3.90-5.90)  x10(6)uL


 


Hgb  15.7   (12.9-17.7)  g/dL


 


Hct  45.9   (38.3-50.1)  %


 


MCV  95.6   (80.8-98.7)  fL


 


MCH  32.6   (27.0-33.3)  pg


 


MCHC  34.1   (28.7-35.3)  g/dL


 


RDW  13.0   (12.4-15.0)  %


 


Plt Count  302   (117-477)  x10(3)uL


 


MPV  7.5   (6.7-11.0)  fL


 


Neut % (Auto)  48.5   (40.3-71.8)  %


 


Lymph % (Auto)  37.8   (15.8-45.3)  %


 


Mono % (Auto)  11.8   (5.5-15.2)  %


 


Eos % (Auto)  1.0   (0.1-6.8)  %


 


Baso % (Auto)  0.9   (0.3-3.8)  %


 


Neut # (Auto)  2.9   (1.7-6.9)  x10-3/uL


 


Lymph # (Auto)  2.2   (0.5-4.5)  x10-3/uL


 


Mono # (Auto)  0.7   (0.0-1.2)  x10-3/uL


 


Eos # (Auto)  0.1   (0.0-0.6)  x10-3/uL


 


Baso # (Auto)  0.1   (0.0-0.3)  x10-3/uL


 


Sodium   139  (135-145)  mmol/L


 


Potassium   4.4  (3.5-5.3)  mmol/L


 


Chloride   101  (100-110)  mmol/L


 


Carbon Dioxide   29  (21-32)  mmol/L


 


BUN   13  (7-18)  mg/dL


 


Creatinine   0.9  (0.70-1.30)  mg/dL


 


Est Cr Clr Drug Dosing   130.51  mL/min


 


Estimated GFR (MDRD)   > 60  (>60)  


 


BUN/Creatinine Ratio   14.4  (9-20)  


 


Glucose   88  ()  mg/dL


 


Calcium   9.2  (8.6-10.2)  mg/dL


 


Total Bilirubin   0.5  (0.1-1.3)  mg/dL


 


AST   25  (5-25)  IU/L


 


ALT   21  D  (12-36)  U/L


 


Alkaline Phosphatase   94  ()  IU/L


 


Total Protein   8.3 H  (6.0-8.0)  g/dL


 


Albumin   4.4  (3.5-5.2)  g/dL


 


Globulin   3.9  g/dL


 


Albumin/Globulin Ratio   1.1  











Meds: 


Medications











Generic Name Dose Route Start Last Admin





  Trade Name Freq  PRN Reason Stop Dose Admin


 


Sodium Chloride  1,000 mls @ 999 mls/hr  01/18/21 15:21 





  Normal Saline  IV  01/18/21 16:21 





  .BOLUS ONE  














Discontinued Medications














Generic Name Dose Route Start Last Admin





  Trade Name Freq  PRN Reason Stop Dose Admin


 


Ketorolac Tromethamine  30 mg  01/18/21 15:22 





  Toradol  IVPUSH  01/18/21 15:23 





  ONETIME ONE  


 


Ondansetron HCl  4 mg  01/18/21 15:22 





  Zofran  IVPUSH  01/18/21 15:23 





  ONETIME ONE  














- Re-Assessments/Exams


Free Text/Narrative Re-Assessment/Exam: 





01/18/21 15:58


pt impatient to leave, declined COVID test altho pt aware that COVID is one of 

the viruses that can cause gastroenteritis





he agreed to Rx for nausea med





he requested a note to be off work today, declined to be off work tomorrow





ambulating and seeming to do well





inc'd TP c/w viral gastroenteritis, normal CRP goes against but does not exclude

COVID





Departure





- Departure


Time of Disposition: 15:53


Disposition: Home, Self-Care 01


Condition: Good


Clinical Impression: 


 Nausea and vomiting








- Discharge Information


*PRESCRIPTION DRUG MONITORING PROGRAM REVIEWED*: Not Applicable


*COPY OF PRESCRIPTION DRUG MONITORING REPORT IN PATIENT ALEJANDRO: Not Applicable


Prescriptions: 


Ondansetron [Ondansetron ODT] 4 mg PO Q6H PRN #6 tab.rapdis


 PRN Reason: Nausea


Instructions:  Nausea and Vomiting, Adult


Referrals: 


PCP,None [Primary Care Provider] - 


Forms:  ED Return to Work/School Form


Additional Instructions: 


For nausea, take ondansetron 4 mg under the tongue every 6 hours as needed.





Increase fluids.





Get adequate rest.





There have been several viruses that have caused the stomach flu. One of them is

COVID although there are others.





Use good handwashing.





See your doctor if you symptoms are not gone in 3 days.





Return to ED if you are feeling worse or develop new symptoms.





Sepsis Event Note (ED)





- Focused Exam


Vital Signs: 


                                   Vital Signs











  Temp Pulse Resp BP Pulse Ox


 


 01/18/21 13:38  36.7 C  89  17  162/71 H  98














- My Orders


Last 24 Hours: 


My Active Orders





01/18/21 15:21


C-REACTIVE PROTEIN [CHEM] Stat 


CORONAVIRUS COVID-19 MATT [MOLEC] Stat 


Sodium Chloride 0.9% [Normal Saline] 1,000 ml IV .BOLUS 





01/18/21 15:24


LIPASE [CHEM] Stat 














- Assessment/Plan


Last 24 Hours: 


My Active Orders





01/18/21 15:21


C-REACTIVE PROTEIN [CHEM] Stat 


CORONAVIRUS COVID-19 MATT [MOLEC] Stat 


Sodium Chloride 0.9% [Normal Saline] 1,000 ml IV .BOLUS 





01/18/21 15:24


LIPASE [CHEM] Stat

## 2021-02-18 ENCOUNTER — HOSPITAL ENCOUNTER (EMERGENCY)
Dept: HOSPITAL 7 - FB.ED | Age: 26
Discharge: HOME | End: 2021-02-18
Payer: MEDICAID

## 2021-02-18 VITALS — SYSTOLIC BLOOD PRESSURE: 120 MMHG | HEART RATE: 84 BPM | DIASTOLIC BLOOD PRESSURE: 72 MMHG
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
## 2021-02-18 DIAGNOSIS — S40.811A: Primary | ICD-10-CM
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
## 2022-08-25 ENCOUNTER — HOSPITAL ENCOUNTER (EMERGENCY)
Dept: HOSPITAL 7 - FB.ED | Age: 27
Discharge: HOME | End: 2022-08-25
Payer: MEDICARE

## 2022-08-25 VITALS — HEART RATE: 74 BPM | SYSTOLIC BLOOD PRESSURE: 125 MMHG | DIASTOLIC BLOOD PRESSURE: 83 MMHG

## 2022-08-25 DIAGNOSIS — S93.401A: Primary | ICD-10-CM

## 2022-08-25 DIAGNOSIS — F17.210: ICD-10-CM
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
